# Patient Record
Sex: FEMALE | Race: BLACK OR AFRICAN AMERICAN | URBAN - METROPOLITAN AREA
[De-identification: names, ages, dates, MRNs, and addresses within clinical notes are randomized per-mention and may not be internally consistent; named-entity substitution may affect disease eponyms.]

---

## 2020-08-12 ENCOUNTER — OFFICE VISIT (OUTPATIENT)
Dept: ORTHOPEDIC SURGERY | Age: 50
End: 2020-08-12
Payer: COMMERCIAL

## 2020-08-12 VITALS — WEIGHT: 278 LBS | BODY MASS INDEX: 56.04 KG/M2 | HEIGHT: 59 IN

## 2020-08-12 PROCEDURE — G8427 DOCREV CUR MEDS BY ELIG CLIN: HCPCS | Performed by: FAMILY MEDICINE

## 2020-08-12 PROCEDURE — G8417 CALC BMI ABV UP PARAM F/U: HCPCS | Performed by: FAMILY MEDICINE

## 2020-08-12 PROCEDURE — 99203 OFFICE O/P NEW LOW 30 MIN: CPT | Performed by: FAMILY MEDICINE

## 2020-08-12 PROCEDURE — 1036F TOBACCO NON-USER: CPT | Performed by: FAMILY MEDICINE

## 2020-08-12 RX ORDER — DICLOFENAC SODIUM 75 MG/1
75 TABLET, DELAYED RELEASE ORAL 2 TIMES DAILY
Qty: 60 TABLET | Refills: 3 | Status: SHIPPED | OUTPATIENT
Start: 2020-08-12

## 2020-08-13 NOTE — PROGRESS NOTES
Chief Complaint    Shoulder Pain (N LEFT SHOULDER)    Initial consultation ongoing left shoulder pain with weakness and motion loss status post fall off a chair in March 2020    History of Present Illness: Raul Rees is a 51 y.o. female who is a very pleasant right-hand-dominant white male patient of Alayna Tavares MD who is being seen today in kind consultation from Chachanabeel Torrez for evaluation of ongoing left shoulder pain. Apparently in March 2020 she was attempting to sit down on the chair that had wheels on the bottom and lost her balance falling onto her left shoulder. She does not really recall a pop or crack but was very painful initially. She is uncertain as whether or not there is a sensation of shifting or active dislocation. Since that time she has had definite worsening of pain elevating her arm and does have a constant ache at 3 to 4-10 with much more sharp 8 out of 10 with active elevation. She has been maintained on Naprosyn and also supplementing with over-the-counter ibuprofen and Tylenol. She did see Dr. Johan Varela and Syl Damon PT recently who recommended today's orthopedic consultation. She is tried rehabilitation but is now having difficulty sleeping and feels very weak. She is not really complaining of substantial cervical symptoms or radiculopathy. She does have some occasional periscapular discomfort. She is being seen today for orthopedic and sports consultation with imaging. Medical History  Patient's medications, allergies, past medical, surgical, social and family histories were reviewed and updated as appropriate. Review of Systems  Relevant review of systems reviewed on 8/12/2020 and available in the patient's chart under the medial tab. Vital Signs  There were no vitals filed for this visit. General Exam:   Constitutional: Patient is adequately groomed with no evidence of malnutrition  DTRs: Deep tendon reflexes are intact  Mental Status:  The patient is oriented to time, place and person. The patient's mood and affect are appropriate. Lymphatic: The lymphatic examination bilaterally reveals all areas to be without enlargement or induration. Vascular: Examination reveals no swelling or calf tenderness. Peripheral pulses are palpable and 2+. Neurological: The patient has good coordination. There is no weakness or sensory deficit. Shoulder Examination    Inspection: There does not appear to be a high-grade deformity although she does have some glenohumeral crepitation. It is difficult to appreciate an effusion secondary to size. Palpation: She is definitely tender along the posterior cuff and greater tuberosity. She does have some AC and anterior cuff and biceps tenderness as well. Rang of Motion: She does have restrictions in motion. She can only really abduct to about 80 and forward flex about 95. External rotation is limited to 30 and internal rotation is just better than hip pocket. Strength: She does have pain associated weakness 4- out of 5 with supra and infraspinatus testing producing pain at 7-8 out of 10. Subscap is better at 4+ out of 5. Special Tests: Equivocal drop test.  Pain with empty can testing. She does have 8 out of 10 pain with impingement testing. Glenohumeral grind testing does reproduce a portion of her pain as does labral testing. She is guarding making stability testing very difficult and her screening cervical testing appears to be reasonably benign today. Skin: There are no rashes, ulcerations or lesions. Distal motor sensory and vascular exam is intact. Gait:  reasonable gait. Reflexes:  Symmetrically preserved. Additional Comments:        Additional Examinations:  Contralateral Exam: Examination of the right shoulder reveals no atrophy or deformity. The skin is warm and dry. Range of motion is within normal limits. There is no focal tenderness with palpation.   No AC joint tenderness. Negative Neer's and Tran-Dhaval exams. Strength is graded 5/5 throughout. Right Upper Extremity:  Examination of the right upper extremity does not show any tenderness, deformity or injury. Range of motion is unremarkable. There is no gross instability. There are no rashes, ulcerations or lesions. Strength and tone are normal.  Left Upper Extremity: Examination of the left upper extremity does not show any tenderness, deformity or injury. Range of motion is unremarkable. There is no gross instability. There are no rashes, ulcerations or lesions. Strength and tone are normal.         Diagnostic Test Findings:   Left shoulder true AP outlet and axillary films were obtained today in the office and does show fairly prominent lateral humeral arthritic change with inferior glenohumeral subluxation. I see no evidence of acute acute displaced fracture. Assessment: #1.   5-month status post fall with left shoulder pain with underlying glenohumeral osteoarthritis and possible internal derangement (rotator cuff strain/impingement ) with weakness and motion loss       Impression:    Encounter Diagnoses   Name Primary?  Acute pain of left shoulder     Primary osteoarthritis of left shoulder Yes    Impingement syndrome of left shoulder        Office Procedures:     Orders Placed This Encounter   Procedures    XR SHOULDER LEFT (MIN 2 VIEWS)     Standing Status:   Future     Number of Occurrences:   1     Standing Expiration Date:   8/12/2021    MRI SHOULDER LEFT WO CONTRAST     Scheduling Instructions:      [facility] [address] [facility] [address] [phone]      FAX: [phone]            **PUSH IMAGES TO [facility] PACS**                  PENDING APPROVAL FROM PRE-CERT DEPARTMENT       PLEASE CALL OUR CENTRAL SCHEDULING [phone] ONCE YOU HAVE TEST DAY AND TIME TO SCHEDULE FOLLOW UP WITH DR. Larissa Garcia.      Order Specific Question: Reason for exam:     Answer:   Russ 85 CUFF/LABRAL TEAR, INSTABILITY    External Referral To Physical Therapy     Referral Priority:   Routine     Referral Type:   Eval and Treat     Referral Reason:   Specialty Services Required     Requested Specialty:   Physical Therapy     Number of Visits Requested:   1       Treatment Plan: Treatment options were discussed with [facility] today. We did review her plain films and exam findings as well as history. Her plain films today suggest underlying glenohumeral degenerative change and exam findings do suggest substantial internal derangement. She has not been responding to anti-inflammatories and therapy. Given her history of trauma, I would like for her to have an MRI of her shoulder to evaluate for cuff tearing and more fully evaluate her underlying degenerative changes which do look considerable. She may utilize a sling and we did place her on diclofenac 75 mg 1 pill twice daily. She may supplement with Tylenol and ice. I would like for her to start physical therapy initially for pain modulation and I will see her back post imaging. Icing and activity modification was discussed. She will contact us in the interim with questions or concerns. This dictation was performed with a verbal recognition program (DRAGON) and it was checked for errors. It is possible that there are still dictated errors within this office note. If so, please bring any errors to my attention for an addendum. All efforts were made to ensure that this office note is accurate.

## 2020-08-18 ENCOUNTER — TELEPHONE (OUTPATIENT)
Dept: ORTHOPEDIC SURGERY | Age: 50
End: 2020-08-18

## 2020-08-18 NOTE — TELEPHONE ENCOUNTER
ATTEMPTED TO CALL PATIENT, AND PARENTS REGARDING MRI APPROVAL. PATIENT & PARENTS VOICEMAIL OR NUMBER IS OUT OF SERVICE.  IF PATIENT CALLS SHE WILL NEED TO CALL [facility] AND SET UP SCAN AND CALL BACK TO SCHEDULE A FOLLOW UP FOR HER RESULTS

## 2020-08-26 ENCOUNTER — TELEPHONE (OUTPATIENT)
Dept: ORTHOPEDIC SURGERY | Age: 50
End: 2020-08-26

## 2020-08-26 NOTE — TELEPHONE ENCOUNTER
GAVE [facility] MEDICAL RECORDS 05/12/2018 TO PRESENT TO JANIS JALLOH TO SCAN IN [facility] TO [address]

## 2020-08-28 ENCOUNTER — TELEPHONE (OUTPATIENT)
Dept: ORTHOPEDIC SURGERY | Age: 50
End: 2020-08-28

## 2020-08-28 NOTE — TELEPHONE ENCOUNTER
PATIENT CALLED STATING SHE WAS UNABLE TO COMPLETE MRI, STATES THAT SHE \"GREW METAL IN HER EAR\" AT SOME POINT IN HER LIFE BUT FAILED TO ANSWER YES WHEN ASKED SCREENING QUESTION BY [facility]. SHE STATES SHE HAD A HEADACHE, FELT DIZZY AND COULD FEEL A PULLING SENSATION IN HER EAR DURING THE START OF THE MRI. ALSO STATES SHE DIDN'T FIT IN THE MACHINE AND BECAME CLAUSTROPHOBIC. PATIENT AWARE THAT MRI IS NOT ADVISED IF THERE IS ANY METAL IN THE BODY. I WILL TALK TO DR Mendel Randle ON Monday WHEN HE IS BACK IN THE OFFICE AND CALL THE PATIENT WITH NEXT STEPS. SHE WAS IN AGREEMENT WITH THIS PLAN.

## 2020-08-31 ENCOUNTER — TELEPHONE (OUTPATIENT)
Dept: ORTHOPEDIC SURGERY | Age: 50
End: 2020-08-31

## 2020-08-31 NOTE — TELEPHONE ENCOUNTER
SPOKE TO PATIENT TODAY AND SHE IS INSISTENT THAT SHE HAS RE-GROWN METAL IN HER EAR AFTER HAVING IT CLEANED OUT BY SHIELA LUDWIG PREVIOUSLY. BASED ON NOTE IN CARE EVERYWHERE THE DOCTOR REASSURED PATIENT IN 2014 THAT SHE DID NOT HAVE A FOREIGN BODY IN HER EAR. PATIENT SAYS SHE CAN FEEL IT MOVING AROUND AND SHE HAD ALL KINDS OF PROBLEMS ATTEMPTING HER MRI SCAN LAST WEEK. SHE STATES THAT SHE KNOWS IT IS METAL. DR Timothy Steel WOULD LIKE HER TO SEE HER PRIMARY CARE DOCTOR AND POTENTIALLY AN ENT TO RESOLVE THIS ISSUE PRIOR TO ATTEMPTING AN MRI SCAN AGAIN. GAVE PATIENT MY DIRECT NUMBER TO CALL ME ONCE SHE RESOLVES THIS ISSUE/GETS CLEARANCE TO HAVE AN MRI.

## 2022-12-01 ENCOUNTER — HOSPITAL ENCOUNTER (EMERGENCY)
Age: 52
Discharge: HOME | End: 2022-12-01
Attending: EMERGENCY MEDICINE
Payer: COMMERCIAL

## 2022-12-01 ENCOUNTER — APPOINTMENT (OUTPATIENT)
Dept: GENERAL RADIOLOGY | Age: 52
End: 2022-12-01
Payer: COMMERCIAL

## 2022-12-01 VITALS
BODY MASS INDEX: 53.42 KG/M2 | HEIGHT: 59 IN | HEART RATE: 94 BPM | DIASTOLIC BLOOD PRESSURE: 91 MMHG | TEMPERATURE: 97.8 F | OXYGEN SATURATION: 100 % | RESPIRATION RATE: 14 BRPM | SYSTOLIC BLOOD PRESSURE: 159 MMHG | WEIGHT: 265 LBS

## 2022-12-01 DIAGNOSIS — M25.571 ACUTE RIGHT ANKLE PAIN: ICD-10-CM

## 2022-12-01 DIAGNOSIS — S70.01XA CONTUSION OF RIGHT HIP, INITIAL ENCOUNTER: Primary | ICD-10-CM

## 2022-12-01 DIAGNOSIS — M25.561 ACUTE PAIN OF RIGHT KNEE: ICD-10-CM

## 2022-12-01 DIAGNOSIS — S00.83XA CONTUSION OF FACE, INITIAL ENCOUNTER: ICD-10-CM

## 2022-12-01 PROCEDURE — 99283 EMERGENCY DEPT VISIT LOW MDM: CPT

## 2022-12-01 PROCEDURE — 73610 X-RAY EXAM OF ANKLE: CPT

## 2022-12-01 PROCEDURE — 6370000000 HC RX 637 (ALT 250 FOR IP): Performed by: EMERGENCY MEDICINE

## 2022-12-01 PROCEDURE — 73560 X-RAY EXAM OF KNEE 1 OR 2: CPT

## 2022-12-01 PROCEDURE — 73502 X-RAY EXAM HIP UNI 2-3 VIEWS: CPT

## 2022-12-01 RX ORDER — IBUPROFEN 800 MG/1
800 TABLET ORAL ONCE
Status: COMPLETED | OUTPATIENT
Start: 2022-12-01 | End: 2022-12-01

## 2022-12-01 RX ORDER — IBUPROFEN 800 MG/1
800 TABLET ORAL EVERY 8 HOURS PRN
Qty: 30 TABLET | Refills: 0 | Status: SHIPPED | OUTPATIENT
Start: 2022-12-01

## 2022-12-01 RX ORDER — CYCLOBENZAPRINE HCL 10 MG
10 TABLET ORAL 3 TIMES DAILY PRN
Qty: 30 TABLET | Refills: 0 | Status: SHIPPED | OUTPATIENT
Start: 2022-12-01 | End: 2022-12-11

## 2022-12-01 RX ADMIN — IBUPROFEN 800 MG: 800 TABLET, FILM COATED ORAL at 14:05

## 2022-12-01 ASSESSMENT — PAIN - FUNCTIONAL ASSESSMENT
PAIN_FUNCTIONAL_ASSESSMENT: NONE - DENIES PAIN
PAIN_FUNCTIONAL_ASSESSMENT: NONE - DENIES PAIN

## 2022-12-01 NOTE — Clinical Note
Crystal Wynn was seen and treated in our emergency department on 12/1/2022. She may return to work on 12/03/2022. If you have any questions or concerns, please don't hesitate to call.       Carmen Mariscal MD

## 2022-12-01 NOTE — ED NOTES
Patient given prescription, work note, discharge instructions verbal and written, patient verbalized understanding. Alert/oriented X4, Clear speech.   Patient exhibits no distress, ambulates with steady gait per self leaving unit, no further request.      Mamie Soria RN  12/01/22 9257

## 2022-12-01 NOTE — ED PROVIDER NOTES
Emergency Physician Note        Note Open Time: 2:26 PM EST    Chief Complaint  Hip Pain (right)       History of Present Illness  Nathalie Turner is a 47 y.o. female who presents to the ED for hip pain. Patient reports she sat on a picnic table to eat her lunch and the picnic table collapsed. She fell and injured her right leg. She has multiple painful areas including a spot near her right eye that hurts as well. Pains are moderate and constant and nonradiating. She denies loss of consciousness. 10 systems reviewed, pertinent positives per HPI otherwise noted to be negative    I have reviewed the following from the nursing documentation:      Prior to Admission medications    Medication Sig Start Date End Date Taking? Authorizing Provider   diclofenac (VOLTAREN) 75 MG EC tablet Take 1 tablet by mouth 2 times daily 8/12/20   Zakiya Ye MD       Allergies as of 12/01/2022 - never reviewed   Allergen Reaction Noted    Penicillins Hives and Rash 02/25/2012       No past medical history on file. Surgical History: No past surgical history on file. Family History:  No family history on file.     Social History     Socioeconomic History    Marital status: Single     Spouse name: Not on file    Number of children: Not on file    Years of education: Not on file    Highest education level: Not on file   Occupational History    Not on file   Tobacco Use    Smoking status: Never    Smokeless tobacco: Never   Substance and Sexual Activity    Alcohol use: Not on file    Drug use: Not on file    Sexual activity: Not on file   Other Topics Concern    Not on file   Social History Narrative    Not on file     Social Determinants of Health     Financial Resource Strain: Not on file   Food Insecurity: Not on file   Transportation Needs: Not on file   Physical Activity: Not on file   Stress: Not on file   Social Connections: Not on file   Intimate Partner Violence: Not on file   Housing Stability: Not on file Nursing notes reviewed. ED Triage Vitals [12/01/22 1255]   Enc Vitals Group      BP (!) 159/91      Heart Rate 94      Resp 14      Temp 97.8 °F (36.6 °C)      Temp src       SpO2 100 %      Weight 265 lb (120.2 kg)      Height 4' 11\" (1.499 m)      Head Circumference       Peak Flow       Pain Score       Pain Loc       Pain Edu? Excl. in [address]? GENERAL:  Awake, alert. Well developed, morbidly obese with no apparent distress. HENT:  Normocephalic, there is a roughly 1 cm² contusion over the right maxillary prominence. Extraocular movements are intact and painless, no depressed skull fracture or hematoma. No bony tenderness about the head or face, moist mucous membranes. EYES:  Pupils equal round and reactive to light, Conjunctiva normal, extraocular movements normal.  NECK:  No meningeal signs, Supple. No tenderness. CHEST:  Regular rate and rhythm, chest wall non-tender. LUNGS:  Clear to auscultation bilaterally. ABDOMEN:  Soft, non-tender, no rebound, rigidity or guarding, non-distended, normal bowel sounds. No costovertebral angle tenderness to palpation. BACK:  No tenderness. Remainder of axial and appendicular skeleton NT exc as noted. Full active ROM as well at all jts exc as noted. EXTREMITIES:  Normal range of motion, no edema, tenderness over the right hip and knee and ankle but full active range of motion intact with normal tendon function and neurovascular intact to the toes, no deformity, distal pulses present. Remainder of axial and appendicular skeleton NT exc as noted. Full active ROM as well at all jts exc as noted. SKIN: Warm, dry and intact. NEUROLOGIC: Normal mental status. Moving all extremities to command. RADIOLOGY  X-RAYS:  I have reviewed radiologic plain film image(s). ALL OTHER NON-PLAIN FILM IMAGES SUCH AS CT, ULTRASOUND AND MRI HAVE BEEN READ BY THE RADIOLOGIST.   XR HIP 2-3 VW W PELVIS RIGHT   Final Result      Right hip: 2 views demonstrate mild right hip arthritis. No fracture or dislocation. Right knee: 2 views demonstrate moderate lateral and severe medial/patellofemoral compartment osteoarthritis. No fracture. Trace knee effusion. Right ankle: 3 views demonstrate no fracture. Mild midfoot arthritis. Ankle spur noted. XR ANKLE RIGHT (MIN 3 VIEWS)   Final Result      Right hip: 2 views demonstrate mild right hip arthritis. No fracture or dislocation. Right knee: 2 views demonstrate moderate lateral and severe medial/patellofemoral compartment osteoarthritis. No fracture. Trace knee effusion. Right ankle: 3 views demonstrate no fracture. Mild midfoot arthritis. Ankle spur noted. XR KNEE RIGHT (1-2 VIEWS)   Final Result      Right hip: 2 views demonstrate mild right hip arthritis. No fracture or dislocation. Right knee: 2 views demonstrate moderate lateral and severe medial/patellofemoral compartment osteoarthritis. No fracture. Trace knee effusion. Right ankle: 3 views demonstrate no fracture. Mild midfoot arthritis. Ankle spur noted. MEDICAL DECISION MAKING    ED medications:   Medications   ibuprofen (ADVIL;MOTRIN) tablet 800 mg (800 mg Oral Given 12/1/22 1405)       I advised the patient to return to the emergency department immediately for any new or worsening symptoms, such as vomiting or any new pains. The patient voiced agreement and understanding of the treatment plan. No results found for this visit on 12/01/22. I estimate there is LOW risk for ABDOMINAL AORTIC ANEURYSM, CAUDA EQUINA or CENTRAL CORD SYNDROME, COMPARTMENT SYNDROME, EPIDURAL MASS LESION, HERNIATED DISK CAUSING SEVERE STENOSIS, INTRACRANIAL HEMORRHAGE, INTRA-ABDOMINAL INJURY, PERFORATED BOWEL, SUBDURAL HEMATOMA, TENDON or NEUROVASCULAR INJURY, or a THORACIC AORTIC DISSECTION, thus I consider the discharge disposition reasonable. Also, there is no evidence or peritonitis, sepsis, or toxicity.  Netta Yan and I have discussed the diagnosis and risks, and we agree with discharging home to follow-up with their primary doctor. We also discussed returning to the Emergency Department immediately if new or worsening symptoms occur. We have discussed the symptoms which are most concerning (e.g., bloody stool, fever, changing or worsening pain, vomiting) that necessitate immediate return. Final Impression    1. Contusion of right hip, initial encounter    2. Acute pain of right knee    3. Contusion of face, initial encounter    4. Acute right ankle pain        Blood pressure (!) 159/91, pulse 94, temperature 97.8 °F (36.6 °C), resp. rate 14, height 4' 11\" (1.499 m), weight 265 lb (120.2 kg), SpO2 100 %. Patient was given scripts for the following medications. I counseled patient how to take these medications. Discharge Medication List as of 12/1/2022  2:51 PM        START taking these medications    Details   ibuprofen (ADVIL;MOTRIN) 800 MG tablet Take 1 tablet by mouth every 8 hours as needed for Pain, Disp-30 tablet, R-0Normal      cyclobenzaprine (FLEXERIL) 10 MG tablet Take 1 tablet by mouth 3 times daily as needed for Muscle spasms, Disp-30 tablet, R-0Normal             Disposition  Pt is in good condition upon Discharge to home. This chart was generated using the [address] [place] [address] [address] dictation system. I created this record but it may contain dictation errors.           Chet Carrillo MD  12/01/22 5090

## 2022-12-01 NOTE — ED NOTES
Patient to ed per [facility] after a picnic table she was sitting on broke, patient reports left hip pain and reports she hit the back of her head, no loc.      Holly Duran RN  12/01/22 3101

## 2022-12-01 NOTE — ED NOTES
Patient given prescription, work note, discharge instructions verbal and written, patient verbalized understanding. Alert/oriented X4, Clear speech.   Patient exhibits no distress, ambulates with steady gait per self leaving unit, no further request.      Macario Murrell RN  12/01/22 8756

## 2022-12-01 NOTE — Clinical Note
Ayah Abrams was seen and treated in our emergency department on 12/1/2022. She may return to work on 12/02/2022. If you have any questions or concerns, please don't hesitate to call.       Caio Fierro MD